# Patient Record
Sex: FEMALE | Race: WHITE | NOT HISPANIC OR LATINO | RURAL
[De-identification: names, ages, dates, MRNs, and addresses within clinical notes are randomized per-mention and may not be internally consistent; named-entity substitution may affect disease eponyms.]

---

## 2017-01-03 ENCOUNTER — OFFICE VISIT (OUTPATIENT)
Dept: RETAIL CLINIC | Facility: CLINIC | Age: 67
End: 2017-01-03

## 2017-01-03 VITALS
HEART RATE: 75 BPM | RESPIRATION RATE: 16 BRPM | TEMPERATURE: 99.5 F | HEIGHT: 64 IN | WEIGHT: 197.6 LBS | OXYGEN SATURATION: 96 % | BODY MASS INDEX: 33.73 KG/M2

## 2017-01-03 DIAGNOSIS — J02.0 STREPTOCOCCAL SORE THROAT: ICD-10-CM

## 2017-01-03 DIAGNOSIS — R68.89 FLU-LIKE SYMPTOMS: Primary | ICD-10-CM

## 2017-01-03 LAB
EXPIRATION DATE: NORMAL
EXPIRATION DATE: NORMAL
FLUAV AG NPH QL: NEGATIVE
FLUBV AG NPH QL: NEGATIVE
INTERNAL CONTROL: NORMAL
INTERNAL CONTROL: NORMAL
Lab: NORMAL
Lab: NORMAL
S PYO AG THROAT QL: NEGATIVE

## 2017-01-03 PROCEDURE — 99213 OFFICE O/P EST LOW 20 MIN: CPT | Performed by: NURSE PRACTITIONER

## 2017-01-03 PROCEDURE — 87880 STREP A ASSAY W/OPTIC: CPT | Performed by: NURSE PRACTITIONER

## 2017-01-03 PROCEDURE — 87804 INFLUENZA ASSAY W/OPTIC: CPT | Performed by: NURSE PRACTITIONER

## 2017-01-03 RX ORDER — PREDNISONE 10 MG/1
TABLET ORAL
Qty: 21 TABLET | Refills: 0 | Status: SHIPPED | OUTPATIENT
Start: 2017-01-03

## 2017-01-03 RX ORDER — AZITHROMYCIN 250 MG/1
TABLET, FILM COATED ORAL
Qty: 6 TABLET | Refills: 0 | Status: SHIPPED | OUTPATIENT
Start: 2017-01-03

## 2017-01-03 NOTE — MR AVS SNAPSHOT
Flor Li   1/3/2017 10:45 AM   Office Visit    Dept Phone:  [phone]   Encounter #:  [id number]    Provider:  PROVIDER JT DEL TORO   Department:  [facility]                Your Full Care Plan              Today's Medication Changes          These changes are accurate as of: 1/3/17 11:04 AM.  If you have any questions, ask your nurse or doctor.               New Medication(s)Ordered:     azithromycin 250 MG tablet   Commonly known as:  ZITHROMAX Z-LALA   Take 2 tablets the first day, then 1 tablet daily for 4 days.       predniSONE 10 MG tablet   Commonly known as:  DELTASONE   6/5/4/3/2/1 As directed PO            Where to Get Your Medications      These medications were sent to [place], [place] - [address] [place] [address] [phone] [place] [id number] [address] [address]     Phone:  [phone]     azithromycin 250 MG tablet    predniSONE 10 MG tablet                  Your Updated Medication List          This list is accurate as of: 1/3/17 11:04 AM.  Always use your most recent med list.                azithromycin 250 MG tablet   Commonly known as:  ZITHROMAX Z-LALA   Take 2 tablets the first day, then 1 tablet daily for 4 days.       CLARITIN PO       LISINOPRIL PO       predniSONE 10 MG tablet   Commonly known as:  DELTASONE   6/5/4/3/2/1 As directed PO               We Performed the Following     POC Influenza A / B     POC Rapid Strep A       You Were Diagnosed With        Codes Comments    Flu-like symptoms    -  Primary ICD-10-CM: R68.89  ICD-9-CM: 780.99     Streptococcal sore throat     ICD-10-CM: J02.0  ICD-9-CM: 034.0       Instructions     None    Patient Instructions History      Upcoming Appointments     Visit Type Date Time Department    OFFICE VISIT 1/3/2017 10:45 AM [facility]      MyChart Signup     [facility] allows you to send messages to your doctor, view your test results, renew your prescriptions,  "schedule appointments, and more. To sign up, go to [email or web address] and click on the Sign Up Now link in the New User? box. Enter your [employer] Activation Code exactly as it appears below along with the last four digits of your Social Security Number and your Date of Birth () to complete the sign-up process. If you do not sign up before the expiration date, you must request a new code.    [employer]vis Activation Code: [email or web address]  Expires: 2017 11:04 AM    If you have questions, you can email [email or web address] or call [phone] to talk to our [employer] staff. Remember, [employer] is NOT to be used for urgent needs. For medical emergencies, dial 911.               Other Info from Your Visit           Allergies     No Known Allergies      Reason for Visit     Headache     Sinusitis           Vital Signs     Pulse Temperature Respirations Height Weight Oxygen Saturation    75 99.5 °F (37.5 °C) (Oral) 16 64\" (162.6 cm) 197 lb 9.6 oz (89.6 kg) 96%    Body Mass Index Smoking Status                33.92 kg/m2 Current Every Day Smoker          Problems and Diagnoses Noted     Flu-like symptoms    -  Primary    Strep throat            "

## 2017-01-03 NOTE — PROGRESS NOTES
"Subjective   Flor Li is a 66 y.o. female.   Visit Vitals   • Pulse 75   • Temp 99.5 °F (37.5 °C) (Oral)   • Resp 16   • Ht 64\" (162.6 cm)   • Wt 197 lb 9.6 oz (89.6 kg)   • SpO2 96%   • BMI 33.92 kg/m2         URI    This is a new problem. The current episode started in the past 7 days. The problem has been rapidly worsening. Associated symptoms include congestion, coughing, headaches, rhinorrhea, sinus pain, sneezing and a sore throat. Pertinent negatives include no diarrhea, nausea or vomiting.      + for flu shot  Daughter + strep 4-5 days ago      The following portions of the patient's history were reviewed and updated as appropriate: allergies, current medications, past family history, past medical history, past social history, past surgical history and problem list.    Review of Systems   Constitutional: Positive for chills, fatigue and fever.   HENT: Positive for congestion, rhinorrhea, sneezing and sore throat.    Respiratory: Positive for cough.    Gastrointestinal: Negative for diarrhea, nausea and vomiting.   Musculoskeletal: Positive for arthralgias and myalgias.   Neurological: Positive for headaches.       Objective   Physical Exam   Constitutional: She appears well-developed and well-nourished.   HENT:   Head: Normocephalic and atraumatic.   Right Ear: Tympanic membrane and ear canal normal.   Left Ear: Tympanic membrane and ear canal normal.   Nose: Rhinorrhea present. Right sinus exhibits maxillary sinus tenderness. Left sinus exhibits maxillary sinus tenderness.   Mouth/Throat: Posterior oropharyngeal erythema (severe) present. No oropharyngeal exudate. Tonsils are 1+ on the right. Tonsils are 1+ on the left. No tonsillar exudate.   Cardiovascular: Regular rhythm and normal heart sounds.    Pulmonary/Chest: Effort normal. She has wheezes (trace). She has rhonchi. She has no rales.   Lymphadenopathy:     She has cervical adenopathy.   Skin: Skin is warm and dry.       Assessment/Plan   Flor was " seen today for headache and sinusitis.    Diagnoses and all orders for this visit:    Flu-like symptoms  -     POC Rapid Strep A  -     POC Influenza A / B    Streptococcal sore throat    Other orders  -     azithromycin (ZITHROMAX Z-LALA) 250 MG tablet; Take 2 tablets the first day, then 1 tablet daily for 4 days.  -     predniSONE (DELTASONE) 10 MG tablet; 6/5/4/3/2/1 As directed PO        Results for orders placed or performed in visit on 01/03/17   POC Rapid Strep A   Result Value Ref Range    Rapid Strep A Screen Negative Negative, VALID, INVALID, Not Performed    Internal Control Passed Passed    Lot Number [id number]     Expiration Date 03436    POC Influenza A / B   Result Value Ref Range    Rapid Influenza A Ag NEGATIVE     Rapid Influenza B Ag NEGATIVE     Internal Control Passed Passed    Lot Number [id number]     Expiration Date 6316766

## 2017-11-04 ENCOUNTER — OFFICE VISIT (OUTPATIENT)
Dept: RETAIL CLINIC | Facility: CLINIC | Age: 67
End: 2017-11-04

## 2017-11-04 DIAGNOSIS — Z23 FLU VACCINE NEED: Primary | ICD-10-CM

## 2017-11-04 NOTE — PROGRESS NOTES
Subjective   Flor Li is a 67 y.o. female.     Reason for Appointment  Vaccine    History of Present Illness  Flor Li requests Influenza High Dose vaccine.  She denies current acute illness, denies adverse reaction to vaccines in the past.  She denies allergy to eggs, latex and any component to vaccines.  She denies history of Guillain Meraux.    Assessments  Flor was seen today for flu vaccine.    Diagnoses and all orders for this visit:    Flu vaccine need    Other orders  -     Flu Vaccine High Dose PF 65YR+ ([id number])        VIS given.  Pt advised that the most common post vaccine complaint is that of a sore arm at the injection site.  Pt also advised that this is a normal reaction to this vaccine.  If there are other concerns that arise, the patient has been advised to call the clinic or return to the clinic. If the pt has difficulty breathing, the patient has been advised to go to the ER.  The pt has stated understanding.    This document has been electronically signed by GEREMIAS Leung November 4, 2017 4:35 PM

## 2018-10-11 ENCOUNTER — OFFICE VISIT (OUTPATIENT)
Dept: RETAIL CLINIC | Facility: CLINIC | Age: 68
End: 2018-10-11

## 2018-10-11 DIAGNOSIS — Z23 NEED FOR VACCINATION: Primary | ICD-10-CM

## 2019-10-11 ENCOUNTER — IMMUNIZATION (OUTPATIENT)
Dept: RETAIL CLINIC | Facility: CLINIC | Age: 69
End: 2019-10-11

## 2019-10-11 DIAGNOSIS — Z23 FLU VACCINE NEED: Primary | ICD-10-CM

## 2019-10-11 PROCEDURE — G0008 ADMIN INFLUENZA VIRUS VAC: HCPCS | Performed by: NURSE PRACTITIONER

## 2019-10-11 PROCEDURE — 90653 IIV ADJUVANT VACCINE IM: CPT | Performed by: NURSE PRACTITIONER

## 2019-10-11 NOTE — PROGRESS NOTES
S: Requests Flu Vaccine.  Is feeling well today. Denies previous complications of flu vaccine, denies serious egg allergy. [employer] consent obtained.  O: Appears well today.  A: Vaccination against Influenza   P: Age appropriate flu vaccine administered (see vaccine immunization record) Tolerated Well.  Discussed that they may feel achy and fatigued in next day or 2, as their immune system is responding, this is not the flu. Advised that can take tylenol or ibuprofen per package instructions for soreness if needed. Also explained that it takes up to 2 weeks for full immune response. Encouraged general health hygiene. GEREMIAS Ortiz

## 2022-02-21 ENCOUNTER — HOSPITAL ENCOUNTER (OUTPATIENT)
Age: 72
End: 2022-02-21
Payer: MEDICARE

## 2022-02-21 DIAGNOSIS — R01.1: ICD-10-CM

## 2022-02-21 DIAGNOSIS — Z72.0: ICD-10-CM

## 2022-02-21 DIAGNOSIS — R09.89: ICD-10-CM

## 2022-02-21 DIAGNOSIS — I10: ICD-10-CM

## 2022-02-21 DIAGNOSIS — E66.9: ICD-10-CM

## 2022-02-21 DIAGNOSIS — J30.9: ICD-10-CM

## 2022-02-21 DIAGNOSIS — Z76.89: Primary | ICD-10-CM

## 2022-02-21 DIAGNOSIS — M79.671: ICD-10-CM

## 2022-02-21 LAB
25-OH VITAMIN D, TOTAL: 54.9 NG/ML (ref 30–100)
ALBUMIN LEVEL: 4.4 G/DL (ref 3.5–5)
ALBUMIN/GLOB SERPL: 1.7 {RATIO} (ref 1.1–1.8)
ALP ISO SERPL-ACNC: 96 U/L (ref 38–126)
ALT SERPLBLD-CCNC: 25 U/L (ref 12–78)
ANION GAP SERPL CALC-SCNC: 11.2 MEQ/L (ref 5–15)
AST SERPL QL: 29 U/L (ref 14–36)
BILIRUBIN,TOTAL: 0.4 MG/DL (ref 0.2–1.3)
BUN SERPL-MCNC: 15 MG/DL (ref 7–17)
CALCIUM SPEC-MCNC: 9.5 MG/DL (ref 8.4–10.2)
CHLORIDE SPEC-SCNC: 99 MMOL/L (ref 98–107)
CHOLEST SPEC-SCNC: 289 MG/DL (ref 140–200)
CO2 SERPL-SCNC: 32 MMOL/L (ref 22–30)
CREAT BLD-SCNC: 0.6 MG/DL (ref 0.52–1.04)
ESTIMATED GLOMERULAR FILT RATE: 99 ML/MIN (ref 60–?)
GFR (AFRICAN AMERICAN): 119 ML/MIN (ref 60–?)
GLOBULIN SER CALC-MCNC: 2.6 G/DL (ref 1.3–3.2)
GLUCOSE: 76 MG/DL (ref 74–100)
HCT VFR BLD CALC: 39.7 % (ref 37–47)
HDLC SERPL-MCNC: 50 MG/DL (ref 40–60)
HGB BLD-MCNC: 12.9 G/DL (ref 12.2–16.2)
MCHC RBC-ENTMCNC: 32.6 G/DL (ref 31.8–35.4)
MCV RBC: 90.6 FL (ref 81–99)
MEAN CORPUSCULAR HEMOGLOBIN: 29.5 PG (ref 27–31.2)
PLATELET # BLD: 350 K/MM3 (ref 142–424)
POTASSIUM: 3.2 MMOL/L (ref 3.5–5.1)
PROT SERPL-MCNC: 7 G/DL (ref 6.3–8.2)
RBC # BLD AUTO: 4.38 M/MM3 (ref 4.2–5.4)
SODIUM SPEC-SCNC: 139 MMOL/L (ref 136–145)
T4 (THYROXINE): 8.6 UG/DL (ref 5.53–11)
TRIGLYCERIDES: 266 MG/DL (ref 30–150)
TSH SERPL-ACNC: 1.2 UIU/ML (ref 0.47–4.68)
WBC # BLD AUTO: 11.5 K/MM3 (ref 4.8–10.8)

## 2022-02-21 PROCEDURE — 85025 COMPLETE CBC W/AUTO DIFF WBC: CPT

## 2022-02-21 PROCEDURE — 80053 COMPREHEN METABOLIC PANEL: CPT

## 2022-02-21 PROCEDURE — 80061 LIPID PANEL: CPT

## 2022-02-21 PROCEDURE — 82306 VITAMIN D 25 HYDROXY: CPT

## 2022-02-21 PROCEDURE — 84443 ASSAY THYROID STIM HORMONE: CPT

## 2022-02-21 PROCEDURE — 84436 ASSAY OF TOTAL THYROXINE: CPT

## 2022-02-23 ENCOUNTER — HOSPITAL ENCOUNTER (OUTPATIENT)
Age: 72
End: 2022-02-23
Payer: MEDICARE

## 2022-02-23 DIAGNOSIS — I10: ICD-10-CM

## 2022-02-23 DIAGNOSIS — Z72.0: ICD-10-CM

## 2022-02-23 DIAGNOSIS — E78.5: Primary | ICD-10-CM

## 2022-02-23 DIAGNOSIS — Z79.899: ICD-10-CM

## 2022-02-23 LAB — HBA1C MFR BLD: 5.7 % (ref 4–6)

## 2022-02-23 PROCEDURE — 83036 HEMOGLOBIN GLYCOSYLATED A1C: CPT
